# Patient Record
Sex: MALE | Race: WHITE | NOT HISPANIC OR LATINO | Employment: FULL TIME | URBAN - METROPOLITAN AREA
[De-identification: names, ages, dates, MRNs, and addresses within clinical notes are randomized per-mention and may not be internally consistent; named-entity substitution may affect disease eponyms.]

---

## 2017-03-01 PROBLEM — M25.552 LEFT HIP PAIN: Status: ACTIVE | Noted: 2017-03-01

## 2017-03-01 PROBLEM — E78.5 HYPERLIPIDEMIA: Status: ACTIVE | Noted: 2017-03-01

## 2017-03-01 PROBLEM — R76.8 HSV-2 SEROPOSITIVE: Status: ACTIVE | Noted: 2017-03-01

## 2017-03-01 PROBLEM — R19.7 DIARRHEA: Status: ACTIVE | Noted: 2017-03-01

## 2017-03-01 PROBLEM — N52.9 ERECTILE DYSFUNCTION: Status: ACTIVE | Noted: 2017-03-01

## 2017-03-01 PROBLEM — Z57.5: Status: ACTIVE | Noted: 2017-03-01

## 2017-03-23 PROBLEM — M53.3 SI (SACROILIAC) PAIN: Status: ACTIVE | Noted: 2017-03-23

## 2017-03-23 PROBLEM — M51.36 DDD (DEGENERATIVE DISC DISEASE), LUMBAR: Status: ACTIVE | Noted: 2017-03-23

## 2019-05-31 ENCOUNTER — TELEPHONE (OUTPATIENT)
Dept: HEMATOLOGY ONCOLOGY | Facility: MEDICAL CENTER | Age: 44
End: 2019-05-31

## 2019-05-31 NOTE — TELEPHONE ENCOUNTER
Patient called he stated that he received a message on my chart stating to call our office to schedule an appointment. I informed patient that we just received this fax and that we would call him in about a week to schedule an appointment he agreed and understood.

## 2020-05-21 PROBLEM — G89.29 CHRONIC LEFT-SIDED LOW BACK PAIN WITH LEFT-SIDED SCIATICA: Status: ACTIVE | Noted: 2020-05-21

## 2020-05-21 PROBLEM — M54.42 CHRONIC LEFT-SIDED LOW BACK PAIN WITH LEFT-SIDED SCIATICA: Status: ACTIVE | Noted: 2020-05-21

## 2020-06-09 PROBLEM — R19.7 DIARRHEA: Status: RESOLVED | Noted: 2017-03-01 | Resolved: 2020-06-09

## 2022-05-24 PROBLEM — Z82.49 FAMILY HISTORY OF CORONARY ARTERY DISEASE: Status: ACTIVE | Noted: 2021-08-29

## 2022-05-24 PROBLEM — R00.2 PALPITATIONS: Status: ACTIVE | Noted: 2021-08-29

## 2022-06-08 PROBLEM — K40.20 BILATERAL INGUINAL HERNIA: Status: ACTIVE | Noted: 2022-06-08
